# Patient Record
Sex: MALE | Race: WHITE
[De-identification: names, ages, dates, MRNs, and addresses within clinical notes are randomized per-mention and may not be internally consistent; named-entity substitution may affect disease eponyms.]

---

## 2019-02-02 ENCOUNTER — HOSPITAL ENCOUNTER (EMERGENCY)
Dept: HOSPITAL 60 - LB.ED | Age: 24
LOS: 1 days | Discharge: HOME | End: 2019-02-03
Payer: SELF-PAY
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
## 2019-02-02 DIAGNOSIS — S42.022A: Primary | ICD-10-CM

## 2019-02-02 PROCEDURE — 99283 EMERGENCY DEPT VISIT LOW MDM: CPT

## 2019-02-02 PROCEDURE — 73000 X-RAY EXAM OF COLLAR BONE: CPT

## 2019-02-02 RX ADMIN — HYDROCODONE BITARTRATE AND ACETAMINOPHEN ONE TAB: 10; 325 TABLET ORAL at 23:06

## 2019-02-02 NOTE — EDM.PDOC
ED HPI GENERAL MEDICAL PROBLEM





- General


Chief Complaint: General


Stated Complaint: POSSIBLE Fx Clavicle


Time Seen by Provider: 02/02/19 22:45


Source of Information: Reports: Patient


History Limitations: Reports: No Limitations





- History of Present Illness


INITIAL COMMENTS - FREE TEXT/NARRATIVE: 





This is a 24yo M who fell when loading wood and hit his left shoulder. He 

denies any other physical injury. He is in 6/10 pain constantly. It is worse on 

movement and walking. He denies any other health concern. He does not take any 

medications and has no past medical history. 


Onset: Sudden


Location: Reports: Upper Extremity, Left


Severity: Severe


Improves with: Reports: None


Worsens with: Reports: Movement


Associated Symptoms: Reports: No Other Symptoms


  ** Left Clavicle


Pain Score (Numeric/FACES): 5





- Related Data


 Allergies











Allergy/AdvReac Type Severity Reaction Status Date / Time


 


No Known Allergies Allergy   Verified 02/02/19 22:23











Home Meds: 


 Home Meds





NK [No Known Home Meds]  02/02/19 [History]











ED ROS GENERAL





- Review of Systems


Review Of Systems: ROS reveals no pertinent complaints other than HPI.





ED EXAM, GENERAL





- Physical Exam


Exam: See Below


Exam Limited By: No Limitations


General Appearance: Alert, WD/WN, Moderate Distress


Eye Exam: Bilateral Eye: EOMI, PERRL


Ears: Normal External Exam


Nose: Normal Inspection


Throat/Mouth: Normal Inspection


Head: Atraumatic, Normocephalic


Neck: Normal Inspection, Supple, Non-Tender


Respiratory/Chest: No Respiratory Distress, Lungs Clear, Normal Breath Sounds


Cardiovascular: Normal Peripheral Pulses, Regular Rate, Rhythm


Peripheral Pulses: 2+: Dorsalis Pedis (L), Dorsalis Pedis (R)


GI/Abdominal: Normal Bowel Sounds


Extremities: Normal Inspection


Neurological: Alert, Oriented, CN II-XII Intact


Psychiatric: Normal Affect, Normal Mood


Skin Exam: Warm, Dry, Intact





Course





- Vital Signs


Last Recorded V/S: 


 Last Vital Signs











Temp  37.2 C   02/02/19 22:35


 


Pulse  69   02/02/19 22:35


 


Resp  20   02/02/19 22:35


 


BP  111/44 L  02/02/19 22:35


 


Pulse Ox  99   02/02/19 22:35














- Orders/Labs/Meds


Orders: 


 Active Orders 24 hr











 Category Date Time Status


 


 Clavicle Lt [CR] Stat Exams  02/02/19 22:55 Taken











Meds: 


Medications














Discontinued Medications














Generic Name Dose Route Start Last Admin





  Trade Name Quentin  PRN Reason Stop Dose Admin


 


Hydrocodone Bitart/Acetaminophen  1 tab  02/02/19 23:04  02/02/19 23:06





  Norco 325-10 Mg  PO  02/02/19 23:05  1 tab





  ONETIME ONE   Administration





     





     





     





     














Departure





- Departure


Time of Disposition: 23:50


Disposition: Home, Self-Care 01


Condition: Undetermined


Clinical Impression: 


Fracture, clavicle closed, shaft


Qualifiers:


 Encounter type: initial encounter Fracture alignment: displaced Laterality: 

left Qualified Code(s): S42.022A - Displaced fracture of shaft of left clavicle

, initial encounter for closed fracture








- Discharge Information


Instructions:  Acetaminophen; Hydrocodone tablets or capsules, Clavicle Fracture

, Easy-to-Read


Forms:  ED Department Discharge


Additional Instructions: 


Contact [facility] right away Monday morning for further treatment with 

orthopedics.  May take provided Vicodin as directed: 1 tablet by mouth every 4 

hours as needed for pain.  May also need to take additional Tylenol for 

breakthrough pain as well.  Be sure to take a stool softener while taking 

Vicodin due to potential for constipation.  Keep brace in place as instructed 

to help with pain.  Decrease activity level at this time.  Be sure to monitor 

yourself for any changes in breathing/respirations-should you notice any 

increased shortness of breath or difficulty breathing, as well as any numbness, 

tingling, weakness present to left arm below level of injury; should any of 

these symptoms occur return to be seen right away.  Also apply ice pack to 

affected area intermittently every hour to help with pain.  Call with any 

questions.





- Problem List & Annotations


(1) Fracture, clavicle closed, shaft


SNOMED Code(s): 25664384


   Code(s): S42.023A - DISP FX OF SHAFT OF UNSP CLAVICLE, INIT FOR CLOS FX   

Status: Acute   Priority: High   


Qualifiers: 


   Encounter type: initial encounter   Fracture alignment: displaced   

Laterality: left   Qualified Code(s): S42.022A - Displaced fracture of shaft of 

left clavicle, initial encounter for closed fracture   





- Problem List Review


Problem List Initiated/Reviewed/Updated: Yes





- My Orders


Last 24 Hours: 


My Active Orders





02/02/19 22:55


Clavicle Lt [CR] Stat 














- Assessment/Plan


Last 24 Hours: 


My Active Orders





02/02/19 22:55


Clavicle Lt [CR] Stat 











Plan: 





Consulted Dr. Salmeron [facility] and sent PACS and images. Plan for 

patient to f/u in [facility] clinic for further management and likely 

scheduling for surgery. Patient understands the risks and benefits and damage 

done. He will contact [facility] for scheduling and f/u immediately on 

Monday. 


Counseled on supportive care and conservative therapy at this time. Pain meds 

given and to be take as scheduled - counseled on side effects and f/u. Clavicle 

figure eight brace and left arm sling prepared for patient. Patient to f/u if 

any issue with breathing or new onset symptoms. Patient to call hospital if any 

concerns 24/7. Counseled on f/u as directed and patient agrees with plan of 

care.

## 2019-02-03 NOTE — CR
LEFT CLAVICLE, 02/0/2/19



No priors.  



There is a comminuted displaced fracture through the mid clavicle.  There is 
2.7 cm inferior displacement of the distal fragment with respect to the 
proximal.  



There is cortical irregularity at the left second rib laterally suggesting a 
left second rib fracture.  



The left lung is clear.  No pneumothorax.  



No other acute abnormalities.  



[id number]
[facility]